# Patient Record
Sex: MALE
[De-identification: names, ages, dates, MRNs, and addresses within clinical notes are randomized per-mention and may not be internally consistent; named-entity substitution may affect disease eponyms.]

---

## 2018-04-29 ENCOUNTER — HOSPITAL ENCOUNTER (OUTPATIENT)
Dept: HOSPITAL 92 - ERS | Age: 23
Setting detail: OBSERVATION
LOS: 1 days | Discharge: HOME | End: 2018-04-30
Attending: SURGERY | Admitting: SURGERY
Payer: COMMERCIAL

## 2018-04-29 VITALS — BODY MASS INDEX: 25 KG/M2

## 2018-04-29 DIAGNOSIS — K66.1: ICD-10-CM
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
## 2018-04-29 DIAGNOSIS — S36.039A: Primary | ICD-10-CM

## 2018-04-29 LAB
ALBUMIN SERPL BCG-MCNC: 4.8 G/DL (ref 3.5–5)
ALP SERPL-CCNC: 78 U/L (ref 40–150)
ALT SERPL W P-5'-P-CCNC: 26 U/L (ref 8–55)
ANION GAP SERPL CALC-SCNC: 13 MMOL/L (ref 10–20)
AST SERPL-CCNC: 18 U/L (ref 5–34)
BASOPHILS # BLD AUTO: 0 THOU/UL (ref 0–0.2)
BASOPHILS NFR BLD AUTO: 0.3 % (ref 0–1)
BILIRUB SERPL-MCNC: 0.6 MG/DL (ref 0.2–1.2)
BUN SERPL-MCNC: 12 MG/DL (ref 8.9–20.6)
CALCIUM SERPL-MCNC: 9.8 MG/DL (ref 7.8–10.44)
CHLORIDE SERPL-SCNC: 103 MMOL/L (ref 98–107)
CO2 SERPL-SCNC: 27 MMOL/L (ref 22–29)
CREAT CL PREDICTED SERPL C-G-VRATE: 0 ML/MIN (ref 70–130)
EOSINOPHIL # BLD AUTO: 0.2 THOU/UL (ref 0–0.7)
EOSINOPHIL NFR BLD AUTO: 1.3 % (ref 0–10)
GLOBULIN SER CALC-MCNC: 3.4 G/DL (ref 2.4–3.5)
GLUCOSE SERPL-MCNC: 102 MG/DL (ref 70–105)
HETEROPH AB SER QL LA: NEGATIVE
HGB BLD-MCNC: 15.8 G/DL (ref 14–18)
LIPASE SERPL-CCNC: 14 U/L (ref 8–78)
LYMPHOCYTES # BLD: 2.1 THOU/UL (ref 1.2–3.4)
LYMPHOCYTES NFR BLD AUTO: 16.8 % (ref 21–51)
MCH RBC QN AUTO: 33 PG (ref 27–31)
MCV RBC AUTO: 93.4 FL (ref 80–94)
MONO NEGATIVE CONTROL ZONE: (no result)
MONO POSITIVE CONTROL: (no result)
MONOCYTES # BLD AUTO: 0.9 THOU/UL (ref 0.11–0.59)
MONOCYTES NFR BLD AUTO: 7.4 % (ref 0–10)
NEUTROPHILS # BLD AUTO: 9.3 THOU/UL (ref 1.4–6.5)
NEUTROPHILS NFR BLD AUTO: 74.2 % (ref 42–75)
PLATELET # BLD AUTO: 261 THOU/UL (ref 130–400)
POTASSIUM SERPL-SCNC: 3.9 MMOL/L (ref 3.5–5.1)
RBC # BLD AUTO: 4.78 MILL/UL (ref 4.7–6.1)
SODIUM SERPL-SCNC: 139 MMOL/L (ref 136–145)
SP GR UR STRIP: 1.02 (ref 1–1.04)
WBC # BLD AUTO: 12.5 THOU/UL (ref 4.8–10.8)

## 2018-04-29 PROCEDURE — G0378 HOSPITAL OBSERVATION PER HR: HCPCS

## 2018-04-29 PROCEDURE — 83690 ASSAY OF LIPASE: CPT

## 2018-04-29 PROCEDURE — 96375 TX/PRO/DX INJ NEW DRUG ADDON: CPT

## 2018-04-29 PROCEDURE — 96374 THER/PROPH/DIAG INJ IV PUSH: CPT

## 2018-04-29 PROCEDURE — 93005 ELECTROCARDIOGRAM TRACING: CPT

## 2018-04-29 PROCEDURE — 81003 URINALYSIS AUTO W/O SCOPE: CPT

## 2018-04-29 PROCEDURE — 36415 COLL VENOUS BLD VENIPUNCTURE: CPT

## 2018-04-29 PROCEDURE — 71045 X-RAY EXAM CHEST 1 VIEW: CPT

## 2018-04-29 PROCEDURE — 86308 HETEROPHILE ANTIBODY SCREEN: CPT

## 2018-04-29 PROCEDURE — 85025 COMPLETE CBC W/AUTO DIFF WBC: CPT

## 2018-04-29 PROCEDURE — 80053 COMPREHEN METABOLIC PANEL: CPT

## 2018-04-29 PROCEDURE — 74177 CT ABD & PELVIS W/CONTRAST: CPT
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
## 2018-04-29 NOTE — RAD
PORTABLE CHEST ONE VIEW:

 

Date: 4-29-18 

Time: 9:14 p.m.

 

History: Abdominal pain, chest pain, left upper quadrant pain. 

 

FINDINGS: 

The heart size is normal. The lungs are expanded without focal areas of consolidation, pneumothorax, 
or pleural effusions. 

 

IMPRESSION: 

No acute process. 

 

POS: [facility]

## 2018-04-29 NOTE — CT
CT ABDOMEN AND PELVIS WITH IV CONTRAST:

 

History: Left upper quadrant pain. Patient denies injury. 

 

FINDINGS: 

The liver, pancreas, adrenal glands, and right kidney are normal. There is punctate nonobstructing le
ft renal calculus. No calcified gallstones are seen. No free air is seen in the abdomen. There is mil
d infiltrate seen in the left lung base. There is suggestion of a very small laceration involving the
 posterior inferior aspect of the spleen with adjacent small amount of fluid. There is a small amount
 of fluid in the pelvis. 

 

No lymphadenopathy is noted. No acute osseous abnormalities are seen. 

 

IMPRESSION: 

Findings are suspicious for a small laceration involving the posterior inferior aspect of the spleen 
with a small amount of free fluid in the abdomen and pelvis. In the absence of trauma, this may repre
sent spontaneous rupture which can be seen infection such EBV. 

 

This was discussed over the telephone with Emergency Department physician Dr. Salinas at 8:35 p.m. 

 

POS: [facility]

## 2018-04-30 VITALS — SYSTOLIC BLOOD PRESSURE: 123 MMHG | TEMPERATURE: 98 F | DIASTOLIC BLOOD PRESSURE: 70 MMHG

## 2018-04-30 LAB
BASOPHILS # BLD AUTO: 0 THOU/UL (ref 0–0.2)
BASOPHILS NFR BLD AUTO: 0.3 % (ref 0–1)
EOSINOPHIL # BLD AUTO: 0.1 THOU/UL (ref 0–0.7)
EOSINOPHIL NFR BLD AUTO: 1 % (ref 0–10)
HGB BLD-MCNC: 14.3 G/DL (ref 14–18)
LYMPHOCYTES # BLD: 2 THOU/UL (ref 1.2–3.4)
LYMPHOCYTES NFR BLD AUTO: 20 % (ref 21–51)
MCH RBC QN AUTO: 32.9 PG (ref 27–31)
MCV RBC AUTO: 93.5 FL (ref 80–94)
MONOCYTES # BLD AUTO: 1 THOU/UL (ref 0.11–0.59)
MONOCYTES NFR BLD AUTO: 10.5 % (ref 0–10)
NEUTROPHILS # BLD AUTO: 6.7 THOU/UL (ref 1.4–6.5)
NEUTROPHILS NFR BLD AUTO: 68.1 % (ref 42–75)
PLATELET # BLD AUTO: 227 THOU/UL (ref 130–400)
RBC # BLD AUTO: 4.35 MILL/UL (ref 4.7–6.1)
WBC # BLD AUTO: 9.9 THOU/UL (ref 4.8–10.8)
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
